# Patient Record
(demographics unavailable — no encounter records)

---

## 2025-02-25 NOTE — DISCUSSION/SUMMARY
[FreeTextEntry1] : Elevated triglycerides and LDL: Dietary changes were discussed.  Fish oil supplementation, 3 to 4 g/day will help lowering triglycerides.   Exercise program was discussed in detail. Check echocardiogram Given her risk factors, CT calcium score and ETT should be performed. No new medication was started today. I will follow her after above tests Thank you for this referral and allowing me to participate in the care of this patient.  If I can be of any further help or  if you have any questions, please do not hesitate to contact me  Sincerely, Isaac Gale MD, FACC, LUCIE

## 2025-02-25 NOTE — HISTORY OF PRESENT ILLNESS
[FreeTextEntry1] : Keila is a pleasant 68-year-old female with history of hypothyroidism, seronegative rheumatoid arthritis, impaired glucose tolerance, mild dyslipidemia and family history of heart disease. She does not exercise regularly but is fairly active and does not have any exertional chest pain.  It would seem that she is slightly short of breath with heavy exertion but this is not progressive.  No PND, orthopnea or pedal edema.  No palpitations or lightheadedness.

## 2025-03-17 NOTE — HISTORY OF PRESENT ILLNESS
[FreeTextEntry1] : Adeline is a pleasant 68-year-old female with history of hypothyroidism, seronegative rheumatoid arthritis, impaired glucose tolerance, mild dyslipidemia and family history of heart disease. She does not exercise regularly but is fairly active and does not have any exertional chest pain.  It would seem that she is slightly short of breath with heavy exertion but this is not progressive.  No PND, orthopnea or pedal edema.  No palpitations or lightheadedness.  Vigorous activity is limited by left knee pain.  She underwent cardiovascular evaluation with exercise stress test and echocardiogram.   -Exercise stress test February 2025: 6: 55 minutes Indra protocol.  Systolic blood pressure 172.  No angina.  No ischemia. -Echocardiogram 20 2025: Normal LV function-systolic and diastolic; normal chamber sizes and normal PASP.  PFO is noted.

## 2025-03-17 NOTE — DISCUSSION/SUMMARY
[FreeTextEntry1] : Elevated triglycerides and LDL: Dietary changes were discussed.  Fish oil supplementation, 3 to 4 g/day will help lowering triglycerides. This was reminded again.  She has not started it yet. Exercise program was discussed in detail. Echo and ETT were performed and reported as above. CT calcium score is still pending.  That may help decide regarding statin therapy For the long-term, weight loss would be beneficial.  Thank you for this referral and allowing me to participate in the care of this patient.  If I can be of any further help or  if you have any questions, please do not hesitate to contact me  Sincerely, Isaac Gale MD, FACC, LUCIE